# Patient Record
Sex: MALE | Race: WHITE | NOT HISPANIC OR LATINO | Employment: OTHER | URBAN - METROPOLITAN AREA
[De-identification: names, ages, dates, MRNs, and addresses within clinical notes are randomized per-mention and may not be internally consistent; named-entity substitution may affect disease eponyms.]

---

## 2024-07-05 ENCOUNTER — HOSPITAL ENCOUNTER (EMERGENCY)
Facility: HOSPITAL | Age: 69
Discharge: HOME | End: 2024-07-05
Attending: EMERGENCY MEDICINE
Payer: MEDICARE

## 2024-07-05 VITALS
SYSTOLIC BLOOD PRESSURE: 128 MMHG | WEIGHT: 160 LBS | HEART RATE: 97 BPM | RESPIRATION RATE: 20 BRPM | BODY MASS INDEX: 23.7 KG/M2 | OXYGEN SATURATION: 98 % | HEIGHT: 69 IN | TEMPERATURE: 98 F | DIASTOLIC BLOOD PRESSURE: 83 MMHG

## 2024-07-05 DIAGNOSIS — G89.29 CHRONIC BILATERAL LOW BACK PAIN WITHOUT SCIATICA: Primary | ICD-10-CM

## 2024-07-05 DIAGNOSIS — M54.50 CHRONIC BILATERAL LOW BACK PAIN WITHOUT SCIATICA: Primary | ICD-10-CM

## 2024-07-05 PROCEDURE — 96372 THER/PROPH/DIAG INJ SC/IM: CPT | Performed by: NURSE PRACTITIONER

## 2024-07-05 PROCEDURE — 63600175 PHARM REV CODE 636 W HCPCS: Performed by: NURSE PRACTITIONER

## 2024-07-05 PROCEDURE — 99284 EMERGENCY DEPT VISIT MOD MDM: CPT | Mod: 25

## 2024-07-05 RX ORDER — METHOCARBAMOL 500 MG/1
1000 TABLET, FILM COATED ORAL 3 TIMES DAILY
Qty: 30 TABLET | Refills: 0 | Status: SHIPPED | OUTPATIENT
Start: 2024-07-05 | End: 2024-07-10

## 2024-07-05 RX ORDER — MELOXICAM 15 MG/1
15 TABLET ORAL DAILY
Qty: 30 TABLET | Refills: 0 | Status: SHIPPED | OUTPATIENT
Start: 2024-07-05

## 2024-07-05 RX ORDER — HYDROCODONE BITARTRATE AND ACETAMINOPHEN 5; 325 MG/1; MG/1
1 TABLET ORAL EVERY 8 HOURS PRN
Qty: 21 TABLET | Refills: 0 | Status: SHIPPED | OUTPATIENT
Start: 2024-07-05 | End: 2024-07-12

## 2024-07-05 RX ORDER — KETOROLAC TROMETHAMINE 30 MG/ML
30 INJECTION, SOLUTION INTRAMUSCULAR; INTRAVENOUS
Status: COMPLETED | OUTPATIENT
Start: 2024-07-05 | End: 2024-07-05

## 2024-07-05 RX ADMIN — KETOROLAC TROMETHAMINE 30 MG: 30 INJECTION, SOLUTION INTRAMUSCULAR at 12:07

## 2024-07-05 NOTE — DISCHARGE INSTRUCTIONS
Meloxicam for pain/inflammation, take daily. Methocarbamol for muscle spasms/tightness. Norco for more severe pain if needed. Heat, gentle stretches. Keep appointment on 19th.

## 2024-07-05 NOTE — ED PROVIDER NOTES
Encounter Date: 7/5/2024       History     Chief Complaint   Patient presents with    Back Pain     Pt to ed via pov with reports chronic back pain that increased x2 days. Denies injury/trauma. Neuro intact     See MDM    The history is provided by the patient. No  was used.     Review of patient's allergies indicates:  No Known Allergies  No past medical history on file.  No past surgical history on file.  No family history on file.     Review of Systems   Musculoskeletal:  Positive for back pain.   All other systems reviewed and are negative.      Physical Exam     Initial Vitals [07/05/24 1108]   BP Pulse Resp Temp SpO2   128/83 97 20 98.4 °F (36.9 °C) 98 %      MAP       --         Physical Exam    Nursing note and vitals reviewed.  Constitutional: He appears well-developed and well-nourished.   Cardiovascular:  Normal rate, regular rhythm, normal heart sounds and intact distal pulses.           Pulmonary/Chest: Breath sounds normal. No respiratory distress.   Musculoskeletal:      Thoracic back: No tenderness or bony tenderness.      Lumbar back: Tenderness present. No bony tenderness. Normal range of motion.      Comments: Ambulatory steadily. Bilateral dorsiflexion and planter flexion equal and strong. No discrepancy in sensation to either legs.      Neurological: He is alert and oriented to person, place, and time. He has normal strength. No sensory deficit.   Skin: Skin is warm and dry.   Psychiatric: He has a normal mood and affect.         ED Course   Procedures  Labs Reviewed - No data to display       Imaging Results              X-Ray Lumbar Spine Ap And Lateral (In process)                      Medications   ketorolac injection 30 mg (30 mg Intramuscular Given 7/5/24 1216)     Medical Decision Making  68 y/o male presents with long standing low back pain which he received pain meds and back injections in [place]. [employer] sold his house and all his belongings and now is doing the [employer]  thing. He is from here so down here visiting but going back to [place] for another LESI July 19th. States the pain flared up over last couple of days. No new injury/trauma.     Xray shows no acute findings. Toradol given here. Will hold on steroid since getting steroid injection on 19th. Will give nsaids, muscle relaxers and norco.     Amount and/or Complexity of Data Reviewed  Radiology: ordered and independent interpretation performed.     Details: No acute findings     Risk  Prescription drug management.      Additional MDM:   Differential Diagnosis:   Other: The following diagnoses were also considered and will be evaluated: sciatica, lumbar radiculopathy and contusion.                                   Clinical Impression:  Final diagnoses:  [M54.50, G89.29] Chronic bilateral low back pain without sciatica (Primary)          ED Disposition Condition    Discharge Stable          ED Prescriptions       Medication Sig Dispense Start Date End Date Auth. Provider    methocarbamoL (ROBAXIN) 500 MG Tab Take 2 tablets (1,000 mg total) by mouth 3 (three) times daily. for 5 days 30 tablet 7/5/2024 7/10/2024 Christel Garcia FNP    meloxicam (MOBIC) 15 MG tablet Take 1 tablet (15 mg total) by mouth once daily. 30 tablet 7/5/2024 -- Christel Garcia FNP    HYDROcodone-acetaminophen (NORCO) 5-325 mg per tablet Take 1 tablet by mouth every 8 (eight) hours as needed for Pain. 21 tablet 7/5/2024 7/12/2024 Christel Garcia FNP          Follow-up Information    None          Christel Garcia FNP  07/05/24 6586

## 2025-05-30 ENCOUNTER — TELEPHONE (OUTPATIENT)
Dept: TRANSPLANT | Facility: CLINIC | Age: 70
End: 2025-05-30
Payer: MEDICARE

## 2025-05-30 NOTE — TELEPHONE ENCOUNTER
Received request to transfer post lung transplant care from [facility].  Patient has [facility] Medicare Advantage plan which is not accepted at [facility].  LM for Jasper De La Paz RN at [phone] explaining that we can't accept transfer of care due to patient's insurance plan.

## 2025-06-04 ENCOUNTER — TELEPHONE (OUTPATIENT)
Dept: TRANSPLANT | Facility: CLINIC | Age: 70
End: 2025-06-04
Payer: MEDICARE

## 2025-06-04 NOTE — TELEPHONE ENCOUNTER
Received updated insurance information from  [employer].  Patient has [employer] plan.      BSLT 4/27/2023 for COPD  Lost to follow up due to difficulty getting back and forth to [place] from [place].  Presented on 5/27 with decreased PFT - suspected ACR due to self-discontinuing prednisone because he thought he wasn't supposed to take it anymore (admitted and bronched).   Path and cultures negative.  He did get IV steroids.  Discharge plan for follow up in 4-6 weeks with repeat bronch.  Records can be reviewed in Care Everywhere.      Dr. Whitaker reviewed records and would accept transfer of care.  LM for patient to discuss this.

## 2025-07-02 ENCOUNTER — TELEPHONE (OUTPATIENT)
Dept: TRANSPLANT | Facility: CLINIC | Age: 70
End: 2025-07-02
Payer: MEDICARE

## 2025-07-02 DIAGNOSIS — Z94.2 LUNG REPLACED BY TRANSPLANT: Primary | ICD-10-CM

## 2025-07-02 DIAGNOSIS — Z79.52 LONG TERM SYSTEMIC STEROID USER: ICD-10-CM

## 2025-07-02 NOTE — TELEPHONE ENCOUNTER
Returned call to patient and explained that I left a message a couple of weeks ago stating that we received records from [place] regarding transferring care and asked that he return my call to discuss this.  Pt does want to transfer care.  He states he just had testing done locally and everything looked good so [place] wants more testing in 3 months.  Explained that I need to send his info to our  to verify insurance and once I receive the ok, I will call him back to discuss scheduling an appointment in [place] in October.  Advised that he will need to continue to contact his transplant center in [place] for any needs up until he sees us in October.  He verbalized understanding.    Copied from CRM #[id number]. Topic: General Inquiry - Patient Advice  >> Jul 2, 2025  3:45 PM Kaycee wrote:  PT calling about transfer of care. checking to see if referral/records - sent apprx 1 week ago have been recvd and check on status of take over care  Please reach out to patient to advise of status, if anything he needs ti do and next steps.  Please advise at your earliest convenience    Contact [phone]

## 2025-07-08 RX ORDER — NAPROXEN SODIUM 220 MG/1
81 TABLET, FILM COATED ORAL EVERY MORNING
COMMUNITY

## 2025-07-08 RX ORDER — MULTIVITAMIN
1 TABLET ORAL DAILY
COMMUNITY

## 2025-07-08 RX ORDER — PREDNISONE 5 MG/1
5 TABLET ORAL DAILY
COMMUNITY
Start: 2025-05-28

## 2025-07-08 RX ORDER — DEXTROMETHORPHAN HYDROBROMIDE, GUAIFENESIN 5; 100 MG/5ML; MG/5ML
650 LIQUID ORAL EVERY 6 HOURS PRN
COMMUNITY

## 2025-07-08 RX ORDER — ROSUVASTATIN CALCIUM 20 MG/1
20 TABLET, COATED ORAL NIGHTLY
COMMUNITY
Start: 2024-09-30

## 2025-07-08 RX ORDER — MYCOPHENOLATE MOFETIL 250 MG/1
500 CAPSULE ORAL 2 TIMES DAILY
COMMUNITY
Start: 2025-07-01

## 2025-07-08 RX ORDER — TAMSULOSIN HYDROCHLORIDE 0.4 MG/1
0.4 CAPSULE ORAL DAILY
COMMUNITY
Start: 2024-10-03

## 2025-07-08 RX ORDER — ALBUTEROL SULFATE 90 UG/1
1 INHALANT RESPIRATORY (INHALATION) EVERY 4 HOURS PRN
COMMUNITY

## 2025-07-08 RX ORDER — SULFAMETHOXAZOLE AND TRIMETHOPRIM 800; 160 MG/1; MG/1
1 TABLET ORAL
COMMUNITY
Start: 2024-12-31

## 2025-07-08 RX ORDER — ACYCLOVIR 200 MG/1
200 CAPSULE ORAL 2 TIMES DAILY
COMMUNITY
Start: 2024-09-19

## 2025-07-08 RX ORDER — TACROLIMUS 5 MG/1
5 CAPSULE ORAL EVERY 12 HOURS
COMMUNITY
Start: 2025-02-14

## 2025-07-08 RX ORDER — PREGABALIN 75 MG/1
75 CAPSULE ORAL
COMMUNITY

## 2025-07-08 RX ORDER — FLUTICASONE PROPIONATE 50 MCG
2 SPRAY, SUSPENSION (ML) NASAL DAILY PRN
COMMUNITY

## 2025-07-08 RX ORDER — CYCLOBENZAPRINE HCL 5 MG
5 TABLET ORAL
COMMUNITY

## 2025-07-08 RX ORDER — GABAPENTIN 300 MG/1
300 CAPSULE ORAL
COMMUNITY

## 2025-07-08 RX ORDER — TRAMADOL HYDROCHLORIDE 50 MG/1
50 TABLET, FILM COATED ORAL EVERY 6 HOURS PRN
COMMUNITY
Start: 2025-06-01

## 2025-07-08 RX ORDER — POSACONAZOLE 100 MG/1
300 TABLET, DELAYED RELEASE ORAL DAILY
COMMUNITY
Start: 2024-08-03

## 2025-07-08 RX ORDER — CELECOXIB 200 MG/1
200 CAPSULE ORAL DAILY
COMMUNITY

## 2025-07-08 RX ORDER — DULOXETIN HYDROCHLORIDE 30 MG/1
30 CAPSULE, DELAYED RELEASE ORAL DAILY
COMMUNITY
Start: 2024-07-18

## 2025-07-08 RX ORDER — AZATHIOPRINE 50 MG/1
50 TABLET ORAL DAILY
COMMUNITY
End: 2025-07-08 | Stop reason: ALTCHOICE

## 2025-07-08 RX ORDER — ERGOCALCIFEROL 1.25 MG/1
50000 CAPSULE ORAL
COMMUNITY
Start: 2024-08-14

## 2025-07-08 RX ORDER — TRIAMCINOLONE ACETONIDE 55 UG/1
2 SPRAY, METERED NASAL
COMMUNITY

## 2025-07-08 RX ORDER — VALGANCICLOVIR 450 MG/1
900 TABLET, FILM COATED ORAL DAILY
COMMUNITY
Start: 2025-05-28

## 2025-07-08 RX ORDER — PANTOPRAZOLE SODIUM 40 MG/1
40 TABLET, DELAYED RELEASE ORAL DAILY
COMMUNITY

## 2025-07-08 RX ORDER — AZITHROMYCIN 250 MG/1
250 TABLET, FILM COATED ORAL
COMMUNITY

## 2025-07-08 RX ORDER — TIZANIDINE 4 MG/1
4 TABLET ORAL EVERY 6 HOURS PRN
COMMUNITY
Start: 2025-02-13

## 2025-07-08 RX ORDER — METFORMIN HYDROCHLORIDE 500 MG/1
500 TABLET, EXTENDED RELEASE ORAL DAILY
COMMUNITY
Start: 2024-08-16

## 2025-07-08 NOTE — TELEPHONE ENCOUNTER
LM for patient regarding scheduling first transfer of care appointment.    Pt returned my call.  Offered to schedule him on 10/6 for his first appointment with us.  He accepts.  Instructed patient to continue reaching out to his center in [place] for any needs up until that point.  Explained that when he comes for his visit in October, he should go to the [facility] across the street from the main hospital by 8 am then come to clinic and check in for all appointments one right after the other (not following appointment times).  He verbalized understanding of all discussed.

## 2025-08-25 ENCOUNTER — TELEPHONE (OUTPATIENT)
Dept: TRANSPLANT | Facility: CLINIC | Age: 70
End: 2025-08-25
Payer: MEDICARE